# Patient Record
Sex: FEMALE | Race: WHITE | NOT HISPANIC OR LATINO | URBAN - METROPOLITAN AREA
[De-identification: names, ages, dates, MRNs, and addresses within clinical notes are randomized per-mention and may not be internally consistent; named-entity substitution may affect disease eponyms.]

---

## 2019-11-21 ENCOUNTER — APPOINTMENT (OUTPATIENT)
Dept: RADIOLOGY | Facility: OTHER | Age: 54
End: 2019-11-21
Payer: COMMERCIAL

## 2019-11-21 ENCOUNTER — OFFICE VISIT (OUTPATIENT)
Dept: OBGYN CLINIC | Facility: OTHER | Age: 54
End: 2019-11-21
Payer: COMMERCIAL

## 2019-11-21 VITALS
BODY MASS INDEX: 27.31 KG/M2 | WEIGHT: 160 LBS | DIASTOLIC BLOOD PRESSURE: 80 MMHG | SYSTOLIC BLOOD PRESSURE: 120 MMHG | HEART RATE: 59 BPM | HEIGHT: 64 IN

## 2019-11-21 DIAGNOSIS — M25.512 LEFT SHOULDER PAIN, UNSPECIFIED CHRONICITY: ICD-10-CM

## 2019-11-21 DIAGNOSIS — M25.512 ACUTE PAIN OF LEFT SHOULDER: Primary | ICD-10-CM

## 2019-11-21 PROCEDURE — 99203 OFFICE O/P NEW LOW 30 MIN: CPT | Performed by: ORTHOPAEDIC SURGERY

## 2019-11-21 PROCEDURE — 73030 X-RAY EXAM OF SHOULDER: CPT

## 2019-11-21 RX ORDER — MONTELUKAST SODIUM 10 MG/1
10 TABLET ORAL DAILY
Refills: 5 | COMMUNITY
Start: 2019-09-05

## 2019-11-21 RX ORDER — ALBUTEROL SULFATE 90 UG/1
2 AEROSOL, METERED RESPIRATORY (INHALATION) EVERY 4 HOURS PRN
Refills: 5 | COMMUNITY
Start: 2019-09-05

## 2019-11-21 RX ORDER — ESTRADIOL AND NORETHINDRONE ACETATE .5; .1 MG/1; MG/1
1 TABLET ORAL DAILY
COMMUNITY
Start: 2019-05-31

## 2019-11-21 RX ORDER — FEXOFENADINE HCL 180 MG/1
TABLET ORAL
COMMUNITY
Start: 2012-09-21

## 2019-11-21 NOTE — PROGRESS NOTES
Assessment  Diagnoses and all orders for this visit:    Acute pain of left shoulder        Discussion and Plan: It was discussed with the patient today that it seems like she has an acute hemarthrosis to her left shoulder  Due to her significant improvement since last week at this point time we recommend continuing her home exercise program and taking Advil or Aleve as needed for her discomfort  It was discussed with the patient that if she notices the same condition happened to her shoulder joint again or another joint that she should consider being tested for Lyme disease  The patient expressed understanding and will give the office a call if she has any other further issues in the future  Subjective:   Patient ID: Roxy Harper is a 49 y o  female      HPI   Patient is a 52-year-old female who complains of an acute left shoulder pain that started on 11/12/2019  Patient states that she was sitting in her car waiting for an appointment when she noticed her left shoulder get stiff and have increased pain  Patient states that this progressively got worse throughout the week and by Thursday she was unable to even move her arm  She describes difficulty lying flat in bed as well  She described her pain last week as severe constant sharp stabbing and throbbing pain, the pain is intermittent timing and is improving  She treated this with Aleve and home exercise program that she got from her  who previously treated for a frozen shoulder  Patient states that by Tuesday of this week her symptoms significantly have improved and she regained her range of motion but is still complaining of mild constant achy dull pain to her left shoulder  Patient continues to describe difficulties taking off a bra and getting changed        The following portions of the patient's history were reviewed and updated as appropriate: allergies, current medications, past family history, past medical history, past social history, past surgical history and problem list     Review of Systems   Constitutional: Negative  HENT: Negative  Eyes: Negative  Respiratory: Negative  Cardiovascular: Negative  Gastrointestinal: Negative  Endocrine: Negative  Genitourinary: Negative  Musculoskeletal:        As noted in HPI   Skin: Negative  Allergic/Immunologic: Negative  Neurological: Negative  Hematological: Negative  Psychiatric/Behavioral: Negative  Objective:  /80   Pulse 59   Ht 5' 4" (1 626 m)   Wt 72 6 kg (160 lb)   BMI 27 46 kg/m²       Ortho Exam   Left Shoulder Exam  Alignment / Posture:  Normal shoulder posture  Inspection:  No swelling  Palpation:  anterior shoulder tenderness  ROM:  Shoulder   Shoulder ER 40  Shoulder IR T12  Strength:  5/5 supraspinatus, infraspinatus, and subscapularis  Stability:  No objective shoulder instability  Tests: (+) [place]  (+) [place]  (-) [place]  Neurovascular:  Sensation intact in Ax/R/M/U nerve distributions  2+ radial pulse  Physical Exam   Constitutional: She is oriented to person, place, and time  She appears well-developed and well-nourished  Eyes: Pupils are equal, round, and reactive to light  Cardiovascular: Normal rate  Pulmonary/Chest: Effort normal and breath sounds normal    Neurological: She is alert and oriented to person, place, and time  Skin: Skin is warm  Capillary refill takes less than 2 seconds  Psychiatric: She has a normal mood and affect  Her behavior is normal    Vitals reviewed  I have personally reviewed pertinent films in PACS and my interpretation is as follows  Xray of left shoulder on 11/21/19 demonstrates no acute fractures or dislocations or degenerative changes       Scribe Attestation    I,:   Betty Acosta am acting as a scribe while in the presence of the attending physician :        I,:   Francisca Hendrix MD personally performed the services described in this documentation    as scribed in my presence :

## 2021-03-30 DIAGNOSIS — Z23 ENCOUNTER FOR IMMUNIZATION: ICD-10-CM
